# Patient Record
Sex: MALE | Race: WHITE | NOT HISPANIC OR LATINO | Employment: STUDENT | URBAN - NONMETROPOLITAN AREA
[De-identification: names, ages, dates, MRNs, and addresses within clinical notes are randomized per-mention and may not be internally consistent; named-entity substitution may affect disease eponyms.]

---
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
## 2025-06-11 NOTE — PROGRESS NOTES
[facility] - PODIATRY    Today's Date: 06/13/2025     Patient Name: Luis E Muniz  MRN: [id number]  CSN: [id number]  PCP: Provider, No Known  Referring Provider: No ref. provider found    SUBJECTIVE     Chief Complaint   Patient presents with   • Follow-up     PCP: Unknown -left great nail avulsion-PT STATES HE IS HERE TODAY FOR LEFT GREAT TOENAIL INGROWN-PT REPORTS PAIN LEVEL 9/10 AT THE WORST     HPI: Luis E Muniz, a 19 y.o.male, comes to clinic as a(n) established patient complaining of ingrown toenail. Patient has h/o tonsillectomy.    Patient reports that he has had problems with his left hallux medial border.  He has previously had this nail removed with matrixectomy.  He notes that he has been trying to keep it trimmed and has noted erythema, drainage, and swelling.  Patient is unsure if he hit his toe on something or dropped something on the toe.  No known injury.  Reports area is painful to touch.  Patient is non-diabetic. Admits pain at 9/10 level and centered around left hallux nail. Relates previous treatment(s) including PNA. Denies any constitutional symptoms. No other pedal complaints at this time.    Past Medical History:   Diagnosis Date   • Hx of ingrown nail      Past Surgical History:   Procedure Laterality Date   • TONSILLECTOMY     • TYMPANOSTOMY TUBE PLACEMENT       History reviewed. No pertinent family history.  Social History     Socioeconomic History   • Marital status: Single   Tobacco Use   • Smoking status: Never     Passive exposure: Never   • Smokeless tobacco: Never   Vaping Use   • Vaping status: Never Used   Substance and Sexual Activity   • Alcohol use: Yes     Comment: RARE   • Drug use: Never   • Sexual activity: Defer     No Known Allergies  Current Outpatient Medications   Medication Sig Dispense Refill   • doxycycline (VIBRAMYCIN) 100 MG capsule Take 1 capsule by mouth 2 (Two) Times a Day for 10 days. 20 capsule 0   • multivitamin with minerals tablet tablet  Take 1 tablet by mouth Daily. (Patient not taking: Reported on 6/13/2025)     • mupirocin (BACTROBAN) 2 % ointment Apply 1 Application topically to the appropriate area as directed Daily. 30 g 2     No current facility-administered medications for this visit.     Review of Systems   Constitutional:  Negative for activity change.   HENT:  Negative for congestion.    Respiratory:  Negative for apnea and shortness of breath.    Gastrointestinal:  Negative for abdominal pain.   Musculoskeletal:  Negative for arthralgias and back pain.   Psychiatric/Behavioral:  Negative for agitation, behavioral problems and confusion.        OBJECTIVE     Vitals:    06/13/25 1327   BP: 140/74   Pulse: 95   SpO2: 98%       PHYSICAL EXAM  GEN:   Accompanied by none.     Foot/Ankle Exam    GENERAL  Appearance:  appears stated age  Orientation:  AAOx3  Affect:  appropriate  Gait:  unimpaired  Assistance:  independent  Right shoe gear: casual shoe  Left shoe gear: casual shoe    VASCULAR     Left Foot Vascularity   Dorsalis pedis:  2+  Posterior tibial:  2+  Skin temperature:  warm  Edema grading:  None  CFT:  3  Pedal hair growth:  Present  Varicosities:  none     NEUROLOGIC     Left Foot Neurologic   Normal sensation    Light touch sensation: normal  Vibratory sensation: normal  Hot/Cold sensation:  normal  Protective Sensation using [place]-Sierra Monofilament:   Sites intact: 10  Sites tested: 10    MUSCULOSKELETAL     Right Foot Musculoskeletal   Tenderness:  none       Left Foot Musculoskeletal   Ecchymosis:  none  Tenderness:  toe 1 tenderness  Arch:  Normal    MUSCLE STRENGTH     Left Foot Muscle Strength   Foot dorsiflexion:  5  Foot plantar flexion:  5  Foot inversion:  5  Foot eversion:  5    RANGE OF MOTION     Left Foot Range of Motion   Foot and ankle ROM within normal limits      DERMATOLOGIC        Left Foot Dermatologic   Skin  Positive for drainage and erythema.   Nails  1.  Positive for ingrown toenail and  paronychia.      RADIOLOGY/NUCLEAR:  No results found.    LABORATORY/CULTURE RESULTS:      PATHOLOGY RESULTS:       ASSESSMENT/PLAN     Diagnoses and all orders for this visit:    1. Ingrown toenail (Primary)  -     Nail Removal  -     doxycycline (VIBRAMYCIN) 100 MG capsule; Take 1 capsule by mouth 2 (Two) Times a Day for 10 days.  Dispense: 20 capsule; Refill: 0  -     mupirocin (BACTROBAN) 2 % ointment; Apply 1 Application topically to the appropriate area as directed Daily.  Dispense: 30 g; Refill: 2  -     lidocaine (XYLOCAINE) 2% injection 5 mL    2. Paronychia of great toe of left foot  -     Nail Removal  -     doxycycline (VIBRAMYCIN) 100 MG capsule; Take 1 capsule by mouth 2 (Two) Times a Day for 10 days.  Dispense: 20 capsule; Refill: 0  -     mupirocin (BACTROBAN) 2 % ointment; Apply 1 Application topically to the appropriate area as directed Daily.  Dispense: 30 g; Refill: 2  -     lidocaine (XYLOCAINE) 2% injection 5 mL        Comprehensive lower extremity examination and evaluation was performed.  Discussed findings and treatment plan including risks, benefits, and treatment options with patient in detail. Patient agreed with treatment plan.  Discussed with patient options for ingrown nails including a slant back vs a partial/total nail avulsion with/without matrixectomy.  Due to pain today, a nail avulsion is the recommended treatment.  Discussed with patient that anytime a nail matrix is involved there is a possibility of injury to the nail or  the nail may also become dystrophic upon regrowth.    After written consent obtained, total/partial nail avulsion(s) performed as documented in procedure note. Post-procedure instructions given.   Patient will begin daily Epsom salt soaks and cover area with an antibiotic ointment and a Band-Aid.  If area becomes swollen, reddened or has purulent drainage, contact the office.      Due to significant paronychia, patient will begin Vibramycin 100 mg twice daily  and begin applying mupirocin ointment to the nail.  Rx: Vibramycin 100 mg twice daily, mupirocin topical  An After Visit Summary was printed and given to the patient at discharge, including (if requested) any available informative/educational handouts regarding diagnosis, treatment, or medications. All questions were answered to patient/family satisfaction. Should symptoms fail to improve or worsen they agree to call or return to clinic or to go to the Emergency Department. Discussed the importance of following up with any needed screening tests/labs/specialist appointments and any requested follow-up recommended by me today. Importance of maintaining follow-up discussed and patient accepts that missed appointments can delay diagnosis and potentially lead to worsening of conditions.  Return in about 2 weeks (around 6/27/2025) for Recheck, With Mirna CHURCH., or sooner if acute issues arise.  Nail Removal    Date/Time: 6/13/2025 2:10 PM    Performed by: Mirna Wylie APRN  Authorized by: Mirna Wylie APRN  Location: left foot  Location details: left big toe  Anesthesia: digital block    Anesthesia:  Local Anesthetic: lidocaine 2% without epinephrine  Anesthetic total: 5 mL  Preparation: skin prepped with providone-iodine  Amount removed: partial  Nail matrix removed: partial  Dressing: gauze roll  Patient tolerance: patient tolerated the procedure well with no immediate complications  Comments: Flushed with alcohol.  Silvadene applied.  Pressure dressing applied.           This document has been electronically signed by LYNNETTE Cohen on June 13, 2025 16:58 CDT

## 2025-06-13 ENCOUNTER — OFFICE VISIT (OUTPATIENT)
Age: 20
End: 2025-06-13

## 2025-06-13 VITALS
HEIGHT: 71 IN | DIASTOLIC BLOOD PRESSURE: 74 MMHG | SYSTOLIC BLOOD PRESSURE: 140 MMHG | BODY MASS INDEX: 36.4 KG/M2 | OXYGEN SATURATION: 98 % | HEART RATE: 95 BPM | WEIGHT: 260 LBS

## 2025-06-13 DIAGNOSIS — L60.0 INGROWN TOENAIL: Primary | ICD-10-CM

## 2025-06-13 DIAGNOSIS — L03.032 PARONYCHIA OF GREAT TOE OF LEFT FOOT: ICD-10-CM

## 2025-06-13 RX ORDER — MUPIROCIN 20 MG/G
1 OINTMENT TOPICAL DAILY
Qty: 30 G | Refills: 2 | Status: SHIPPED | OUTPATIENT
Start: 2025-06-13

## 2025-06-13 RX ORDER — DOXYCYCLINE 100 MG/1
100 CAPSULE ORAL 2 TIMES DAILY
Qty: 20 CAPSULE | Refills: 0 | Status: SHIPPED | OUTPATIENT
Start: 2025-06-13 | End: 2025-06-23

## 2025-06-13 RX ORDER — LIDOCAINE HYDROCHLORIDE 20 MG/ML
5 INJECTION, SOLUTION INFILTRATION; PERINEURAL ONCE
Status: COMPLETED | OUTPATIENT
Start: 2025-06-13 | End: 2025-06-13

## 2025-06-13 RX ADMIN — LIDOCAINE HYDROCHLORIDE 5 ML: 20 INJECTION, SOLUTION INFILTRATION; PERINEURAL at 13:50

## 2025-06-24 NOTE — PROGRESS NOTES
[facility] - PODIATRY    Today's Date: 06/27/2025     Patient Name: Luis E Muniz  MRN: [id number]  CSN: [id number]  PCP: Provider, No Known  Referring Provider: No ref. provider found    SUBJECTIVE     Chief Complaint   Patient presents with    Follow-up     Provider, No Known  Return in about 2 weeks for Recheck of ingrown toenail  Pt here for follow-up. Pt reports no pain or issues.     HPI: Luis E Muniz, a 19 y.o.male, comes to clinic as a(n) established patient for follow-up treatment of nail avulsion. Patient has h/o tonsillectomy.    Patient reports that he has completed his post avulsion care.  Denies any signs of infection or erythema today.  Patient is non-diabetic. Denies pain. Relates previous treatment(s) including PNA. Denies any constitutional symptoms. No other pedal complaints at this time.    Past Medical History:   Diagnosis Date    Hx of ingrown nail      Past Surgical History:   Procedure Laterality Date    TONSILLECTOMY      TYMPANOSTOMY TUBE PLACEMENT       History reviewed. No pertinent family history.  Social History     Socioeconomic History    Marital status: Single   Tobacco Use    Smoking status: Never     Passive exposure: Never    Smokeless tobacco: Never   Vaping Use    Vaping status: Never Used   Substance and Sexual Activity    Alcohol use: Yes     Comment: RARE    Drug use: Never    Sexual activity: Defer     No Known Allergies  Current Outpatient Medications   Medication Sig Dispense Refill    multivitamin with minerals tablet tablet Take 1 tablet by mouth Daily. (Patient not taking: Reported on 6/27/2025)      mupirocin (BACTROBAN) 2 % ointment Apply 1 Application topically to the appropriate area as directed Daily. (Patient not taking: Reported on 6/27/2025) 30 g 2     No current facility-administered medications for this visit.     Review of Systems   Constitutional:  Negative for activity change.   HENT:  Negative for congestion.    Respiratory:  Negative for  apnea and shortness of breath.    Gastrointestinal:  Negative for abdominal pain.   Musculoskeletal:  Negative for arthralgias and back pain.   Psychiatric/Behavioral:  Negative for agitation, behavioral problems and confusion.        OBJECTIVE     Vitals:    06/27/25 1324   BP: 136/84   Pulse: 93   SpO2: 96%         PHYSICAL EXAM  GEN:   Accompanied by none.     Foot/Ankle Exam    GENERAL  Appearance:  appears stated age  Orientation:  AAOx3  Affect:  appropriate  Gait:  unimpaired  Assistance:  independent  Right shoe gear: casual shoe  Left shoe gear: casual shoe    VASCULAR     Left Foot Vascularity   Dorsalis pedis:  2+  Posterior tibial:  2+  Skin temperature:  warm  Edema grading:  None  CFT:  3  Pedal hair growth:  Present  Varicosities:  none     NEUROLOGIC     Left Foot Neurologic   Normal sensation    Light touch sensation: normal  Vibratory sensation: normal  Hot/Cold sensation:  normal  Protective Sensation using [employer]-[employer] Monofilament:   Sites intact: 10  Sites tested: 10    MUSCULOSKELETAL     Right Foot Musculoskeletal   Tenderness:  none       Left Foot Musculoskeletal   Ecchymosis:  none  Tenderness:  toe 1 tenderness  Arch:  Normal    MUSCLE STRENGTH     Left Foot Muscle Strength   Foot dorsiflexion:  5  Foot plantar flexion:  5  Foot inversion:  5  Foot eversion:  5    RANGE OF MOTION     Left Foot Range of Motion   Foot and ankle ROM within normal limits      DERMATOLOGIC        Left Foot Dermatologic   Skin  Left foot skin is intact.      Left foot additional comments: Scabbing to nail avulsion site.  No signs of infection or drainage today.      RADIOLOGY/NUCLEAR:  No results found.    LABORATORY/CULTURE RESULTS:      PATHOLOGY RESULTS:       ASSESSMENT/PLAN     Diagnoses and all orders for this visit:    1. Avulsion of toenail, subsequent encounter (Primary)          Comprehensive lower extremity examination and evaluation was performed.  Discussed findings and treatment plan including  risks, benefits, and treatment options with patient in detail. Patient agreed with treatment plan.  Nail avulsion site is healed without any signs of infection.  Small amount of scabbing noted.  Patient may discontinue topical antibiotic ointment and any Epsom salt soaks.  Patient may maintain nails and calluses at home utilizing emery board or pumice stone between visits as needed  Encourage patient to call with any questions or concerns.  An After Visit Summary was printed and given to the patient at discharge, including (if requested) any available informative/educational handouts regarding diagnosis, treatment, or medications. All questions were answered to patient/family satisfaction. Should symptoms fail to improve or worsen they agree to call or return to clinic or to go to the Emergency Department. Discussed the importance of following up with any needed screening tests/labs/specialist appointments and any requested follow-up recommended by me today. Importance of maintaining follow-up discussed and patient accepts that missed appointments can delay diagnosis and potentially lead to worsening of conditions.  Return if symptoms worsen or fail to improve., or sooner if acute issues arise.  Procedures       This document has been electronically signed by LYNNETTE Cohen on June 27, 2025 16:08 CDT

## 2025-06-27 ENCOUNTER — OFFICE VISIT (OUTPATIENT)
Age: 20
End: 2025-06-27

## 2025-06-27 VITALS
HEIGHT: 71 IN | WEIGHT: 260 LBS | BODY MASS INDEX: 36.4 KG/M2 | SYSTOLIC BLOOD PRESSURE: 136 MMHG | DIASTOLIC BLOOD PRESSURE: 84 MMHG | OXYGEN SATURATION: 96 % | HEART RATE: 93 BPM

## 2025-06-27 DIAGNOSIS — S91.209D AVULSION OF TOENAIL, SUBSEQUENT ENCOUNTER: Primary | ICD-10-CM
